# Patient Record
Sex: MALE | Race: WHITE | URBAN - METROPOLITAN AREA
[De-identification: names, ages, dates, MRNs, and addresses within clinical notes are randomized per-mention and may not be internally consistent; named-entity substitution may affect disease eponyms.]

---

## 2022-12-28 ENCOUNTER — OFFICE VISIT (OUTPATIENT)
Dept: URBAN - METROPOLITAN AREA CLINIC 43 | Facility: CLINIC | Age: 72
End: 2022-12-28
Payer: MEDICAID

## 2022-12-28 DIAGNOSIS — H53.19 OTHER SUBJECTIVE VISUAL DISTURBANCES: Primary | ICD-10-CM

## 2022-12-28 DIAGNOSIS — H50.89 OTHER SPECIFIED STRABISMUS: ICD-10-CM

## 2022-12-28 PROCEDURE — 92134 CPTRZ OPH DX IMG PST SGM RTA: CPT

## 2022-12-28 PROCEDURE — 92004 COMPRE OPH EXAM NEW PT 1/>: CPT

## 2022-12-28 ASSESSMENT — INTRAOCULAR PRESSURE
OD: 17
OS: 17

## 2022-12-28 NOTE — IMPRESSION/PLAN
Impression: Other specified strabismus: H50.89.  Plan: pt reports Hx of strabismus and eyes crossing. ( cosmetically bothers pt) denies diplopia seeking a referral for eye muscle Sx. will eval at [facility]

## 2022-12-28 NOTE — IMPRESSION/PLAN
Impression: Other subjective visual disturbances: H53.19. Plan: DWP no retinal pathology on exam today, no PVD, RD, tear, holes. recommend pt return in 1 month for [facility]. 
MAC OCT WNL OU

## 2023-01-25 ENCOUNTER — OFFICE VISIT (OUTPATIENT)
Dept: URBAN - METROPOLITAN AREA CLINIC 43 | Facility: CLINIC | Age: 73
End: 2023-01-25
Payer: COMMERCIAL

## 2023-01-25 DIAGNOSIS — H43.811 VITREOUS DEGENERATION, RIGHT EYE: ICD-10-CM

## 2023-01-25 DIAGNOSIS — H50.89 OTHER SPECIFIED STRABISMUS: ICD-10-CM

## 2023-01-25 DIAGNOSIS — E11.9 TYPE 2 DIABETES MELLITUS WITHOUT COMPLICATIONS: Primary | ICD-10-CM

## 2023-01-25 DIAGNOSIS — H25.13 AGE-RELATED NUCLEAR CATARACT, BILATERAL: ICD-10-CM

## 2023-01-25 PROCEDURE — 92134 CPTRZ OPH DX IMG PST SGM RTA: CPT

## 2023-01-25 PROCEDURE — 99214 OFFICE O/P EST MOD 30 MIN: CPT

## 2023-01-25 ASSESSMENT — VISUAL ACUITY
OS: 20/20
OD: 20/20

## 2023-01-25 ASSESSMENT — INTRAOCULAR PRESSURE
OD: 16
OS: 17

## 2023-01-25 ASSESSMENT — KERATOMETRY
OD: 44.88
OS: 44.38

## 2023-01-25 NOTE — IMPRESSION/PLAN
Impression: Type 2 diabetes mellitus without complications: Y31.2. Plan: Ed pt on clinical findings. DWP that there is no retinopathy or macular edema present on exam today. Reminded pt that ADA recommends target A1c of 7.0 or less to minimize risk of retinopathy as well as other systemic complications of DM. Advised pt to RTC if vision changes. Recommend yearly DFE w/ fundus photos and MAC OCT to detect early signs of diabetic retinopathy. Advised pt to visit with PCP regularly, will send today's notes to PCP.

## 2023-01-25 NOTE — IMPRESSION/PLAN
Impression: Other specified strabismus: H50.89. Plan: pt reports Hx of strabismus and eyes crossing. (cosmetically bothers pt) denies diplopia seeking a referral for eye muscle Sx. Pt motivated for strab surgery if he qualifies. Referral sent to Dr. Natalya Segura.